# Patient Record
Sex: FEMALE | Race: WHITE | NOT HISPANIC OR LATINO | URBAN - METROPOLITAN AREA
[De-identification: names, ages, dates, MRNs, and addresses within clinical notes are randomized per-mention and may not be internally consistent; named-entity substitution may affect disease eponyms.]

---

## 2023-01-01 ENCOUNTER — NURSING HOME VISIT (OUTPATIENT)
Dept: POST ACUTE CARE | Facility: EXTERNAL LOCATION | Age: 88
End: 2023-01-01
Payer: MEDICARE

## 2023-01-01 DIAGNOSIS — I27.20 PULMONARY HYPERTENSION (MULTI): ICD-10-CM

## 2023-01-01 DIAGNOSIS — R53.1 GENERAL WEAKNESS: ICD-10-CM

## 2023-01-01 DIAGNOSIS — D64.9 POSTOPERATIVE ANEMIA: ICD-10-CM

## 2023-01-01 DIAGNOSIS — I95.1 ORTHOSTATIC HYPOTENSION: ICD-10-CM

## 2023-01-01 DIAGNOSIS — I35.0 AORTIC VALVE STENOSIS, ETIOLOGY OF CARDIAC VALVE DISEASE UNSPECIFIED: ICD-10-CM

## 2023-01-01 DIAGNOSIS — K81.0 ACUTE CHOLECYSTITIS: ICD-10-CM

## 2023-01-01 DIAGNOSIS — S72.001D AFTERCARE FOR HEALING TRAUMATIC CLOSED FRACTURE OF RIGHT HIP: Primary | ICD-10-CM
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
## 2023-01-01 DIAGNOSIS — R26.81 UNSTEADY GAIT: Primary | ICD-10-CM

## 2023-01-01 DIAGNOSIS — S72.001D AFTERCARE FOR HEALING TRAUMATIC CLOSED FRACTURE OF RIGHT HIP: ICD-10-CM

## 2023-01-01 DIAGNOSIS — N18.30 STAGE 3 CHRONIC KIDNEY DISEASE, UNSPECIFIED WHETHER STAGE 3A OR 3B CKD (MULTI): ICD-10-CM

## 2023-01-01 DIAGNOSIS — I95.1 ORTHOSTATIC HYPOTENSION: Primary | ICD-10-CM

## 2023-01-01 DIAGNOSIS — I10 HYPERTENSION, UNSPECIFIED TYPE: ICD-10-CM

## 2023-01-01 PROCEDURE — 99315 NF DSCHRG MGMT 30 MIN/LESS: CPT | Performed by: INTERNAL MEDICINE

## 2023-01-01 PROCEDURE — 99304 1ST NF CARE SF/LOW MDM 25: CPT | Performed by: INTERNAL MEDICINE

## 2023-01-01 PROCEDURE — 99308 SBSQ NF CARE LOW MDM 20: CPT | Performed by: INTERNAL MEDICINE

## 2023-01-01 PROCEDURE — 99306 1ST NF CARE HIGH MDM 50: CPT | Performed by: INTERNAL MEDICINE

## 2023-04-17 NOTE — LETTER
Patient: Jassi Schreiber  : 1932    Encounter Date: 2023    Admission note 89-year-old female who fell suffering a right hip fracture.  Patient subsequently went for open reduction internal fixation.  Postop course was complicated by acute cholecystitis and subsequently underwent laparoscopic cholecystectomy.  She was stabilized and improved and transferred to the SNF for convalescence and rehab.      Past medical history        patient recently demonstrated some evidence of hypotension was started on midodrine in the hospital.  The dose appears to have been reduced per hospital notes.  There is also history of aortic stenosis recorded is severe with evidence of pulmonary hypertension.  There is also history of hypertension hyperlipidemia macular degeneration chronic kidney disease stage III and evidence of postop anemia.    Medications orders and hospital notes were reviewed.    Social history: Patient lives at home with son    View of systems: Patient denies postural dizziness  No shortness of breath.  Is a mild nonproductive cough since surgery.  But she denies fever chills malaise.  No GI distress.  No nausea vomiting or diarrhea.  Patient does complain of constipation.  No dysuria.  Pain control reasonable at times but other times quite uncomfortable particularly at bedtime and with rehab.  No other issues were reported by staff.      Exam: Blood pressure 142/86, temp 97 2, heart rate , pulse ox 98% on room air.  Patient is alert and oriented x3.  No distress.  HEENT within normal limits.  Lungs are clear to auscultation.  Heart rate and rhythm is regular with a grade 3/6 systolic ejection murmur.  Abdomen is soft nontender.  Laparoscopic puncture sites appear to be clean and intact.  There is no redness or bleeding.  Extremities no edema.  Right hip incision appears to be intact with no redness or drainage  Neuro: Mostly intact      Assessment: Status post right hip fracture with  ORIF.  Status postacute cholecystitis with laparoscopic cholecystectomy  Postop anemia  Postop hypotension improved  Constipation  Comorbidities as above.    Plan: #1 discontinue docusate and start senna plus  2.  Postvoid residual bladder scans to rule out retention since abdomen appears slightly distended  3.  Mucinex DM twice a day as needed for cough #4 improved pain control with Tylenol 1000 mg 3 times a day.  5.  Restart home beta-blocker in the patient's history of aortic stenosis and pulmonary hypertension and heart rate in the 90s.  6.  Wean off midodrine as feasible since blood pressures appear stable and in an acceptable range.  6.  Update labs in a few days      Electronically Signed By: Fidel Mistry DO   4/22/23 10:21 AM

## 2023-04-22 PROBLEM — I10 HYPERTENSION: Status: ACTIVE | Noted: 2023-01-01

## 2023-04-22 PROBLEM — N18.30 STAGE 3 CHRONIC KIDNEY DISEASE (MULTI): Status: ACTIVE | Noted: 2023-01-01

## 2023-04-22 PROBLEM — I27.20 PULMONARY HYPERTENSION (MULTI): Status: ACTIVE | Noted: 2023-01-01

## 2023-04-22 PROBLEM — S72.001D AFTERCARE FOR HEALING TRAUMATIC CLOSED FRACTURE OF RIGHT HIP: Status: ACTIVE | Noted: 2023-01-01

## 2023-04-22 PROBLEM — I35.0 AORTIC VALVE STENOSIS: Status: ACTIVE | Noted: 2023-01-01

## 2023-04-22 PROBLEM — K81.0 ACUTE CHOLECYSTITIS: Status: ACTIVE | Noted: 2023-01-01

## 2023-04-22 PROBLEM — D64.9 POSTOPERATIVE ANEMIA: Status: ACTIVE | Noted: 2023-01-01

## 2023-04-22 NOTE — PROGRESS NOTES
Follow-up visit    Report patient fell last night.  Slid out of chair.  Patient denies syncopal spell or postural dizziness.  Had very minor laceration to left eyebrow.  No other complaints.  Neurochecks have been unremarkable per staff.  Right hip pain overall appears to be manageable and the patient does not want her pain meds increased.    Review of systems  No further constipation.  No abdominal pain.  No nausea vomiting diarrhea.  No postural dizziness.    Medications and orders were reviewed.      Physical exam:  /67, temp 97 5, pulse 77, pulse ox is 96% on room air.  Patient is alert and oriented in no apparent distress appears well.  Lungs are clear to auscultation.  Heart rate and rhythm is regular with occasional ectopy and a 3/6 systolic ejection murmur.  Abdomen is soft nontender.  Legs demonstrate no edema.  No orthostatic blood pressures are available.  Assessment  Overall the patient appears to be convalescing uneventfully from her right hip fracture and recent cholecystectomy.  Recent fall with no significant injuries.  We will need to continue to rule out orthostatic hypotension.  Particularly given patient's history of aortic stenosis and pulmonary hypertension    Plan:  Orthostatic blood pressures supine and standing daily  We will update labs to include CBC and CMP, along with B12 and iron profile

## 2023-04-22 NOTE — LETTER
Patient: Jassi Schreiber  : 1932    Encounter Date: 2023    Follow-up visit    Report patient fell last night.  Slid out of chair.  Patient denies syncopal spell or postural dizziness.  Had very minor laceration to left eyebrow.  No other complaints.  Neurochecks have been unremarkable per staff.  Right hip pain overall appears to be manageable and the patient does not want her pain meds increased.    Review of systems  No further constipation.  No abdominal pain.  No nausea vomiting diarrhea.  No postural dizziness.    Medications and orders were reviewed.      Physical exam:  /67, temp 97 5, pulse 77, pulse ox is 96% on room air.  Patient is alert and oriented in no apparent distress appears well.  Lungs are clear to auscultation.  Heart rate and rhythm is regular with occasional ectopy and a 3/6 systolic ejection murmur.  Abdomen is soft nontender.  Legs demonstrate no edema.  No orthostatic blood pressures are available.  Assessment  Overall the patient appears to be convalescing uneventfully from her right hip fracture and recent cholecystectomy.  Recent fall with no significant injuries.  We will need to continue to rule out orthostatic hypotension.  Particularly given patient's history of aortic stenosis and pulmonary hypertension    Plan:  Orthostatic blood pressures supine and standing daily  We will update labs to include CBC and CMP, along with B12 and iron profile      Electronically Signed By: Fidel Mistry DO   23  1:51 PM

## 2023-04-22 NOTE — PROGRESS NOTES
Admission note 89-year-old female who fell suffering a right hip fracture.  Patient subsequently went for open reduction internal fixation.  Postop course was complicated by acute cholecystitis and subsequently underwent laparoscopic cholecystectomy.  She was stabilized and improved and transferred to the SNF for convalescence and rehab.      Past medical history        patient recently demonstrated some evidence of hypotension was started on midodrine in the hospital.  The dose appears to have been reduced per hospital notes.  There is also history of aortic stenosis recorded is severe with evidence of pulmonary hypertension.  There is also history of hypertension hyperlipidemia macular degeneration chronic kidney disease stage III and evidence of postop anemia.    Medications orders and hospital notes were reviewed.    Social history: Patient lives at home with son    View of systems: Patient denies postural dizziness  No shortness of breath.  Is a mild nonproductive cough since surgery.  But she denies fever chills malaise.  No GI distress.  No nausea vomiting or diarrhea.  Patient does complain of constipation.  No dysuria.  Pain control reasonable at times but other times quite uncomfortable particularly at bedtime and with rehab.  No other issues were reported by staff.      Exam: Blood pressure 142/86, temp 97 2, heart rate , pulse ox 98% on room air.  Patient is alert and oriented x3.  No distress.  HEENT within normal limits.  Lungs are clear to auscultation.  Heart rate and rhythm is regular with a grade 3/6 systolic ejection murmur.  Abdomen is soft nontender.  Laparoscopic puncture sites appear to be clean and intact.  There is no redness or bleeding.  Extremities no edema.  Right hip incision appears to be intact with no redness or drainage  Neuro: Mostly intact      Assessment: Status post right hip fracture with ORIF.  Status postacute cholecystitis with laparoscopic cholecystectomy  Postop  anemia  Postop hypotension improved  Constipation  Comorbidities as above.    Plan: #1 discontinue docusate and start senna plus  2.  Postvoid residual bladder scans to rule out retention since abdomen appears slightly distended  3.  Mucinex DM twice a day as needed for cough #4 improved pain control with Tylenol 1000 mg 3 times a day.  5.  Restart home beta-blocker in the patient's history of aortic stenosis and pulmonary hypertension and heart rate in the 90s.  6.  Wean off midodrine as feasible since blood pressures appear stable and in an acceptable range.  6.  Update labs in a few days

## 2023-05-04 PROBLEM — I95.1 ORTHOSTATIC HYPOTENSION: Status: ACTIVE | Noted: 2023-01-01

## 2023-05-04 NOTE — LETTER
Patient: Jassi Schreiber  : 1932    Encounter Date: 2023    Discharge note  Patient has no complaints.  Specifically no postural dizziness.  Does have some pain in her leg when she ambulates but overall feels this is manageable with current medications.    Medications and orders were reviewed.  Labs were reviewed.  Last hemoglobin was 9.1 and demonstrates slight improvement in overall reasonably stable    Review of systems  No other issues reported by staff or patient.    Physical exam  BP is 110/70 sitting, temp 96.9, pulse 80, pulse ox 96% room air  Patient is alert and oriented x3.  No apparent distress.  Lungs are clear to auscultation.  Heart rate and rhythm is regular.  Extremities demonstrate no significant edema.  Orthostatic blood pressures demonstrate a BP of 115/72 supine and 100/64 standing.  Patient was asymptomatic    Assessment  Patient is status post right hip fracture and stable ambulatory with full weightbearing  History of hypertension and aortic stenosis.  Clinical evidence of orthostatic hypotension stable on midodrine  Status post lap aba for cholecystitis.  Patient is stable and asymptomatic  Postop anemia is stable  Overall the patient is stable to return home to live with her son.  Care plan  Discharge home with home health care.  Discontinue Eliquis after 30 days postop  Follow-up with primary care in 2 to 3 weeks  Follow-up with orthopod as scheduled  Follow-up with cardiology as scheduled      Electronically Signed By: Fidel Mistry DO   23 12:25 PM

## 2023-05-04 NOTE — PROGRESS NOTES
Discharge note  Patient has no complaints.  Specifically no postural dizziness.  Does have some pain in her leg when she ambulates but overall feels this is manageable with current medications.    Medications and orders were reviewed.  Labs were reviewed.  Last hemoglobin was 9.1 and demonstrates slight improvement in overall reasonably stable    Review of systems  No other issues reported by staff or patient.    Physical exam  BP is 110/70 sitting, temp 96.9, pulse 80, pulse ox 96% room air  Patient is alert and oriented x3.  No apparent distress.  Lungs are clear to auscultation.  Heart rate and rhythm is regular.  Extremities demonstrate no significant edema.  Orthostatic blood pressures demonstrate a BP of 115/72 supine and 100/64 standing.  Patient was asymptomatic    Assessment  Patient is status post right hip fracture and stable ambulatory with full weightbearing  History of hypertension and aortic stenosis.  Clinical evidence of orthostatic hypotension stable on midodrine  Status post lap aba for cholecystitis.  Patient is stable and asymptomatic  Postop anemia is stable  Overall the patient is stable to return home to live with her son.  Care plan  Discharge home with home health care.  Discontinue Eliquis after 30 days postop  Follow-up with primary care in 2 to 3 weeks  Follow-up with orthopod as scheduled  Follow-up with cardiology as scheduled

## 2023-05-08 NOTE — PROGRESS NOTES
Skilled nursing unit admission note  89-year-old female recently discharged from this facility to home.  She was originally here for right hip fracture status post open reduction internal fixation.  She also suffered cholecystitis during that hospital stay and underwent a laparoscopic cholecystectomy.  She was transferred to SNF unit and progressed reasonably well and was subsequently sent home for 5 days ago.  Apparently she was seen by home health care at her residence and they felt that she was not functioning as well as she could be and at that arrange for her to be readmitted to SNF    Past medical history attention aortic stenosis, pulmonary hypertension, hypertension, hyperlipidemia CKD 3 and postop anemia    Patient's and orders were reviewed.    Review of systems  Patient denies postural dizziness.  She denies pain  There is no shortness of breath cough fever chills malaise  There is no GI distress no nausea vomiting diarrhea  No chest pain or palpitations  No issues reported by staff    Physical exam  BP is 115/71, temp 97, pulse is 91, pulse ox is 97% on room air  Patient is alert and oriented x3 no apparent distress HEENT is unremarkable  Lungs are clear  Heart rate and rhythm is regular  Abdomen soft nontender  Extremities demonstrate no edema  Neurologically patient appears grossly intact    Assessment  Recent fracture of the right hip with a postop course complicated by cholecystitis.  Patient appears reasonably stable but apparently it was felt that she was not as functional as she should be per home health care evaluation and the patient is readmitted for further rehab  Comorbidities as stated above    Care plan  PT OT to reeval  Monitor orthostatic blood pressures  We will discontinue Eliquis that was used for DVT prophylaxis   Area L Indication Text: Tumors in this location are included in Area L (trunk and extremities).  Mohs surgery is indicated for larger tumors, or tumors with aggressive histologic features, in these anatomic locations.

## 2023-05-08 NOTE — LETTER
Patient: Jassi Schreiber  : 1932    Encounter Date: 2023    Skilled nursing unit admission note  89-year-old female recently discharged from this facility to home.  She was originally here for right hip fracture status post open reduction internal fixation.  She also suffered cholecystitis during that hospital stay and underwent a laparoscopic cholecystectomy.  She was transferred to SNF unit and progressed reasonably well and was subsequently sent home for 5 days ago.  Apparently she was seen by home health care at her residence and they felt that she was not functioning as well as she could be and at that arrange for her to be readmitted to SNF    Past medical history attention aortic stenosis, pulmonary hypertension, hypertension, hyperlipidemia CKD 3 and postop anemia    Patient's and orders were reviewed.    Review of systems  Patient denies postural dizziness.  She denies pain  There is no shortness of breath cough fever chills malaise  There is no GI distress no nausea vomiting diarrhea  No chest pain or palpitations  No issues reported by staff    Physical exam  BP is 115/71, temp 97, pulse is 91, pulse ox is 97% on room air  Patient is alert and oriented x3 no apparent distress HEENT is unremarkable  Lungs are clear  Heart rate and rhythm is regular  Abdomen soft nontender  Extremities demonstrate no edema  Neurologically patient appears grossly intact    Assessment  Recent fracture of the right hip with a postop course complicated by cholecystitis.  Patient appears reasonably stable but apparently it was felt that she was not as functional as she should be per home health care evaluation and the patient is readmitted for further rehab  Comorbidities as stated above    Care plan  PT OT to reeval  Monitor orthostatic blood pressures  We will discontinue Eliquis that was used for DVT prophylaxis      Electronically Signed By: Fidel Misrty DO   23 10:18 AM

## 2023-05-12 NOTE — PROGRESS NOTES
Discharge note  Patient has no complaints.  No issues are reported by staff.  Patient reportedly has been ambulating well with assistance of a walker.  Patient had been here at the SNF recently for closed right hip fracture.  She convalesced well and went home but failed home health care support trial and was returned to SNF for a few days of additional rehab    Medications and orders were reviewed.    Physical exam  /70, temp 97, pulse 90, pulse ox is 100% room air  She is alert oriented x3 good spirits no apparent distress lungs are clear.  Heart rate and rhythm is regular abdomen is soft nontender there is no significant edema.  The patient was able to stand with the assistance of a walker from a recliner chair and ambulated well without evidence of orthostatic hypotension.  Gait appeared reasonably steady    Assessment and care plan  Overall the patient appears to be reasonably stable and she will discharge home again with home health care.  She is to follow-up with orthopedics as scheduled and her PCP within 2 to 3 weeks

## 2023-05-12 NOTE — LETTER
Patient: Jassi Schreiber  : 1932    Encounter Date: 2023    Discharge note  Patient has no complaints.  No issues are reported by staff.  Patient reportedly has been ambulating well with assistance of a walker.  Patient had been here at the SNF recently for closed right hip fracture.  She convalesced well and went home but failed home health care support trial and was returned to SNF for a few days of additional rehab    Medications and orders were reviewed.    Physical exam  /70, temp 97, pulse 90, pulse ox is 100% room air  She is alert oriented x3 good spirits no apparent distress lungs are clear.  Heart rate and rhythm is regular abdomen is soft nontender there is no significant edema.  The patient was able to stand with the assistance of a walker from a recliner chair and ambulated well without evidence of orthostatic hypotension.  Gait appeared reasonably steady    Assessment and care plan  Overall the patient appears to be reasonably stable and she will discharge home again with home health care.  She is to follow-up with orthopedics as scheduled and her PCP within 2 to 3 weeks        Electronically Signed By: Fidel Mistry DO   23 11:01 AM